# Patient Record
Sex: FEMALE | Race: OTHER | URBAN - NONMETROPOLITAN AREA
[De-identification: names, ages, dates, MRNs, and addresses within clinical notes are randomized per-mention and may not be internally consistent; named-entity substitution may affect disease eponyms.]

---

## 2022-03-22 ENCOUNTER — NEW PATIENT (OUTPATIENT)
Dept: URBAN - NONMETROPOLITAN AREA CLINIC 1 | Facility: CLINIC | Age: 62
End: 2022-03-22

## 2022-03-22 DIAGNOSIS — E11.3293: ICD-10-CM

## 2022-03-22 DIAGNOSIS — H52.4: ICD-10-CM

## 2022-03-22 DIAGNOSIS — Z79.4: ICD-10-CM

## 2022-03-22 DIAGNOSIS — H40.013: ICD-10-CM

## 2022-03-22 PROCEDURE — 92015 DETERMINE REFRACTIVE STATE: CPT

## 2022-03-22 PROCEDURE — 99204 OFFICE O/P NEW MOD 45 MIN: CPT

## 2022-03-22 ASSESSMENT — TONOMETRY
OS_IOP_MMHG: 17
OD_IOP_MMHG: 18

## 2022-03-22 ASSESSMENT — VISUAL ACUITY
OS_SC: 20/30-1
OD_SC: 20/25-2

## 2022-07-01 ENCOUNTER — FOLLOW UP (OUTPATIENT)
Dept: URBAN - NONMETROPOLITAN AREA CLINIC 1 | Facility: CLINIC | Age: 62
End: 2022-07-01

## 2022-07-01 DIAGNOSIS — H40.1131: ICD-10-CM

## 2022-07-01 DIAGNOSIS — E11.3293: ICD-10-CM

## 2022-07-01 PROCEDURE — 92134 CPTRZ OPH DX IMG PST SGM RTA: CPT

## 2022-07-01 PROCEDURE — 99214 OFFICE O/P EST MOD 30 MIN: CPT

## 2022-07-01 PROCEDURE — 92133 CPTRZD OPH DX IMG PST SGM ON: CPT

## 2022-07-01 PROCEDURE — 76514 ECHO EXAM OF EYE THICKNESS: CPT

## 2022-07-01 PROCEDURE — 92083 EXTENDED VISUAL FIELD XM: CPT

## 2022-07-01 ASSESSMENT — TONOMETRY
OS_IOP_MMHG: 17
OD_IOP_MMHG: 16

## 2022-07-01 ASSESSMENT — VISUAL ACUITY
OD_SC: 20/30
OS_SC: 20/30

## 2022-07-01 ASSESSMENT — PACHYMETRY
OS_CT_UM: 510
OD_CT_UM: 511

## 2022-07-01 NOTE — PATIENT DISCUSSION
Discussed diagnosis in detail with patient. Recommend keeping tight control on blood sugars. Recommend no sodas. OCT done today and OD WNL and Os shows trace thickening. Would like to bring patient back in a coupleof weeks to repeat. Consider retinal consult if no improvement. Continue to monitor.

## 2022-07-01 NOTE — PATIENT DISCUSSION
Condition was discussed with patient and patient understands. Based on today's testing recommend patient starting Vyzulta QHS OU, sample given today and RX sent to pharmacy. Will continue to monitor patient for any progression in condition. Patient was advised to call us with any problems, questions, or concerns.

## 2022-07-22 ENCOUNTER — FOLLOW UP (OUTPATIENT)
Dept: URBAN - NONMETROPOLITAN AREA CLINIC 1 | Facility: CLINIC | Age: 62
End: 2022-07-22

## 2022-07-22 DIAGNOSIS — E11.3293: ICD-10-CM

## 2022-07-22 PROCEDURE — 92134 CPTRZ OPH DX IMG PST SGM RTA: CPT

## 2022-07-22 PROCEDURE — 99213 OFFICE O/P EST LOW 20 MIN: CPT

## 2022-07-22 ASSESSMENT — TONOMETRY
OD_IOP_MMHG: 17
OS_IOP_MMHG: 17

## 2022-07-22 ASSESSMENT — VISUAL ACUITY
OS_SC: 20/50+1
OD_PH: 20/25
OS_PH: 20/25-1
OD_SC: 20/40+1

## 2022-07-22 NOTE — PATIENT DISCUSSION
Condition was discussed with patient and patient understands. Recommend patient to continue Vyzulta QHS OU to see if IOP's will drop. Will continue to monitor patient for any progression in condition. Patient was advised to call us with any problems, questions, or concerns.

## 2022-07-22 NOTE — PATIENT DISCUSSION
Discussed diagnosis in detail with patient. Recommend keeping tight control on blood sugars. Recommend no sodas. OCT done today and OD WNL and OS shows trace thickening with edema. Will bring patient back and redo testing.  Consider retinal consult if no improvement. Continue to monitor.